# Patient Record
Sex: FEMALE | Race: WHITE
[De-identification: names, ages, dates, MRNs, and addresses within clinical notes are randomized per-mention and may not be internally consistent; named-entity substitution may affect disease eponyms.]

---

## 2017-04-16 ENCOUNTER — HOSPITAL ENCOUNTER (EMERGENCY)
Dept: HOSPITAL 25 - UCEAST | Age: 67
Discharge: HOME | End: 2017-04-16
Payer: COMMERCIAL

## 2017-04-16 VITALS — SYSTOLIC BLOOD PRESSURE: 148 MMHG | DIASTOLIC BLOOD PRESSURE: 80 MMHG

## 2017-04-16 DIAGNOSIS — J06.9: Primary | ICD-10-CM

## 2017-04-16 DIAGNOSIS — I10: ICD-10-CM

## 2017-04-16 PROCEDURE — 87651 STREP A DNA AMP PROBE: CPT

## 2017-04-16 PROCEDURE — 99212 OFFICE O/P EST SF 10 MIN: CPT

## 2017-04-16 PROCEDURE — G0463 HOSPITAL OUTPT CLINIC VISIT: HCPCS

## 2017-04-16 NOTE — UC
Sheree BRO SooYoung, [facility] on 04/16/17 at 1055 .





Throat Pain/Nasal Zac HPI





- HPI Summary


HPI Summary: 





IN ROOM NOTE: [place] 67 y[place]o [place] [facility] [place] onset sore throat 

beginning 6 days ago. Associated sx: HA, cough, as well as ear pain and 

subjective fever onset this AM, since resolved. Episode of feeling feverish 

lasted approx 15 minutes while at [employer]. Denies: dizziness, CP, SOB, pain with 

swallowing, sinus pressure/congestion, abd pain, urinary sx. Pt has been 

babysitting her grandson this past week, and says her daughter and son-in-law 

are both currently ill.








MD NOTE: Vital signs stable. Temp 99.1. BP: 131/74. Pulse ox: 100. Non-smoker; 

PMHx: MS, osteoporosis, HTN. 








NURSE'S NOTE:


SICK FOR 1 WEEK. CAME ON SUDDENLY WITH HORRIBLE HEADACHE AND SORE THROAT. FEELS 

IT GOING INTO BOTH EARS BUT SEEMS TO HAVE SELF RESOLVED NOW. NO SIGNIFICANT HX 

OF URIs OR SINUS INFECTIONS. 


[ End ]














- History of Current Complaint


Stated Complaint: SORE THROAT FEVER COUGH


Hx Obtained From: Patient


Onset/Duration: Sudden Onset, Lasting Hours, Lasting Days, Still Present, 

Resolved


Severity: Mild


Pain Intensity: 0


Pain Scale Used: 0-10 Numeric


Associated Signs & Symptoms: Positive: Fever - subjective





- Allergies/Home Medications


Allergies/Adverse Reactions: 


 Allergies











Allergy/AdvReac Type Severity Reaction Status Date / Time


 


Meclizine [From Antivert] Allergy Intermediate Nausea Verified 04/16/17 10:53














PMH/Surg Hx/FS Hx/Imm Hx


Previously Healthy: No


Endocrine History Of: 


   Denies: Diabetes


Cardiovascular History Of: Reports: Hypertension - TREATED WITH MEDS


   Denies: Pacemaker/ICD


GI/ History Of: 


   Denies: Renal Disease


Neurological History Of: 


   Denies: Migraine


Cancer History Of: 


   Denies: Breast Cancer





- Surgical History


Surgical History: Yes


Surgery Procedure, Year, and Place: TONSILECTOMY.  2 - C SECTIONS.  LAPAROSCOPY





- Family History


Known Family History: Positive: Hypertension, Diabetes, Other - POS: BREAST CA





- Social History


Occupation: Retired


Lives: Alone


Smoking Status (MU): Never Smoked Tobacco





Review of Systems


Constitutional: Fever - subjective, felt hot


ENT: Sore Throat, Ear Ache


Respiratory: Cough


Neurological: Headache


All Other Systems Reviewed And Are Negative: Yes





Physical Exam


Triage Information Reviewed: Yes


Appearance: Well-Appearing, No Pain Distress, Well-Nourished


Vital Signs: 


 Initial Vital Signs











Temp  99.1 F   04/16/17 10:55


 


Pulse  88   04/16/17 10:55


 


Resp  16   04/16/17 10:55


 


BP  131/74   04/16/17 10:55


 


Pulse Ox  100   04/16/17 10:55











Vital Signs Reviewed: Yes


Eyes: Positive: Conjunctiva Clear


ENT: Positive: Hearing grossly normal, Pharynx normal, TMs normal.  Negative: 

Muffled/hoarse voice


Neck: Positive: Supple, No Lymphadenopathy


Respiratory: Positive: Chest non-tender, Lungs clear, Normal breath sounds, No 

respiratory distress


Cardiovascular: Positive: RRR, No Murmur


Abdomen Description: Positive: Nontender, Soft


Bowel Sounds: Positive: Present


Musculoskeletal: Positive: Strength Intact, Other: - ARMANDO


Neurological: Positive: Alert


Psychological: Positive: Age Appropriate Behavior


Skin: Negative: rashes





Throat Pain/Nasal Course/Dx





- Course


Course Of Treatment: A 67 y/o F with history of MS, well-controlled, and no 

history of migraines, presents with what sounds like a brief one week hx of 

URI. This AM for roughly 15 minutes, pt had a HA. She has vague complaints of 

sore throat and congestion that at the time of her examination were resolved. 

Specifically, during her episode, she had no CP, SOB, n/v. Her temp is 99.1 and 

her BP is slightly elevated but she is on Lisinopril. Her pulse ox is nml. 

According to nurse, pt is under significant recent stress.  Medications have 

been included in the original chart and reviewed.  Rapid strep test is 

negative. No orthostatic vital changes.





- Differential Dx/Diagnosis


Differential Diagnosis/HQI/PQRI: Sinusitis, URI, Other - vs. viral syndrome vs 

anxiety


Provider Diagnoses: 1. Upper Respiratory Infection, resolving. 2. Possible 

vasovagal episode.





Discharge





- Discharge Plan


Condition: Stable


Disposition: HOME


Patient Education Materials:  Upper Respiratory Infection (ED)


Referrals: 


Julissa Ashley MD [Primary Care Provider] - 


Additional Instructions: 


AS WE DISCUSSED:





1.  Your episode in [employer] this morning is probably related to your ongoing 

upper respiratory viral infection that will get better over the next week.  

Sometimes you can have a momentary change in pulse and blood pressure caused by 

various environmental factors (heat, crowd, anxiety, etc.) that can cause a 

feeling of heat and a headache. Your examination today in the [facility] was normal.


2.  Your blood pressure and pulse readings were within normal limits when you 

changed position.  Your strep test was negative.


3.  Increase fluids; use steam or hot shower to clean out any continued 

congestion in your head or chest.


4.  Re check at any time for a repeat episode.  





Ear Complaint





- History of Current Complaint


Chief Complaint: UCRespiratory


Time Seen by Provider: 04/16/17 10:57





- Allergies/Home Medications


Allergies/Adverse Reactions: 


 Allergies











Allergy/AdvReac Type Severity Reaction Status Date / Time


 


Meclizine [From Antivert] Allergy Intermediate Nausea Verified 04/16/17 10:53














Lab Results





- Lab Results


Lab Results: 


 











  04/16/17





  11:06


 


Group A Strep Rapid  Negative














The documentation as recorded by the Sheree whitaker SooYoung accurately 

reflects the service I personally performed and the decisions made by me, 

Kb Lopez MD.

## 2018-05-08 ENCOUNTER — HOSPITAL ENCOUNTER (EMERGENCY)
Dept: HOSPITAL 25 - ED | Age: 68
Discharge: LEFT BEFORE DISCHARGE | End: 2018-05-08
Payer: MEDICARE

## 2018-05-08 DIAGNOSIS — R51: ICD-10-CM

## 2018-05-08 DIAGNOSIS — R42: Primary | ICD-10-CM

## 2018-05-08 DIAGNOSIS — M54.9: ICD-10-CM

## 2018-05-08 DIAGNOSIS — Z53.21: ICD-10-CM
